# Patient Record
Sex: MALE | Race: WHITE | NOT HISPANIC OR LATINO | Employment: STUDENT | URBAN - METROPOLITAN AREA
[De-identification: names, ages, dates, MRNs, and addresses within clinical notes are randomized per-mention and may not be internally consistent; named-entity substitution may affect disease eponyms.]

---

## 2019-06-03 ENCOUNTER — TELEPHONE (OUTPATIENT)
Dept: SCHEDULING | Facility: IMAGING CENTER | Age: 19
End: 2019-06-03

## 2020-10-20 ENCOUNTER — HOSPITAL ENCOUNTER (OUTPATIENT)
Dept: CARDIOLOGY | Facility: MEDICAL CENTER | Age: 20
End: 2020-10-20
Attending: FAMILY MEDICINE
Payer: COMMERCIAL

## 2020-10-20 ENCOUNTER — HOSPITAL ENCOUNTER (OUTPATIENT)
Dept: LAB | Facility: MEDICAL CENTER | Age: 20
End: 2020-10-20
Attending: FAMILY MEDICINE
Payer: COMMERCIAL

## 2020-10-20 DIAGNOSIS — B33.22 COXSACKIE MYOCARDITIS: ICD-10-CM

## 2020-10-20 DIAGNOSIS — U07.1 INFECTION DUE TO 2019-NCOV: ICD-10-CM

## 2020-10-20 LAB
LV EJECT FRACT  99904: 60
LV EJECT FRACT MOD 2C 99903: 61.88
LV EJECT FRACT MOD 4C 99902: 61.37
LV EJECT FRACT MOD BP 99901: 60.34
TROPONIN T SERPL-MCNC: 9 NG/L (ref 6–19)

## 2020-10-20 PROCEDURE — 84484 ASSAY OF TROPONIN QUANT: CPT

## 2020-10-20 PROCEDURE — 93306 TTE W/DOPPLER COMPLETE: CPT | Mod: 26 | Performed by: INTERNAL MEDICINE

## 2020-10-20 PROCEDURE — 93306 TTE W/DOPPLER COMPLETE: CPT

## 2020-10-20 PROCEDURE — 36415 COLL VENOUS BLD VENIPUNCTURE: CPT

## 2021-02-18 ENCOUNTER — OFFICE VISIT (OUTPATIENT)
Dept: DERMATOLOGY | Facility: IMAGING CENTER | Age: 21
End: 2021-02-18
Payer: COMMERCIAL

## 2021-02-18 VITALS — TEMPERATURE: 98.6 F | BODY MASS INDEX: 25.9 KG/M2 | WEIGHT: 185 LBS | HEIGHT: 71 IN

## 2021-02-18 DIAGNOSIS — R21 RASH AND NONSPECIFIC SKIN ERUPTION: ICD-10-CM

## 2021-02-18 PROCEDURE — 11104 PUNCH BX SKIN SINGLE LESION: CPT | Performed by: DERMATOLOGY

## 2021-02-18 RX ORDER — ITRACONAZOLE 100 MG/1
100 CAPSULE ORAL
COMMUNITY
Start: 2021-01-06

## 2021-02-18 RX ORDER — LISDEXAMFETAMINE DIMESYLATE 20 MG/1
CAPSULE ORAL EVERY MORNING
COMMUNITY
Start: 2021-02-08

## 2021-02-18 NOTE — PROGRESS NOTES
DERMATOLOGY NOTE  NEW VISIT       Chief complaint: Rash     HPI:Fungal growth on back, trunk   Onset: 1 year  Symptoms: White patchy areas   Aggravating factors: Sunlight  Alleviating factors: No  New creams/topicals: No  New medications (up to last 6 months): No  New travel: No  Other exposures: No  Treatments: Itraconazole for 7 days (about 1 month ago from a derm in [place]), no improvement  Was told may need biopsy if no improvement.      History reviewed. No pertinent past medical history.     History reviewed. No pertinent family history.     Social History     Socioeconomic History   • Marital status: Single     Spouse name: Not on file   • Number of children: Not on file   • Years of education: Not on file   • Highest education level: Not on file   Occupational History   • Not on file   Tobacco Use   • Smoking status: Never Smoker   • Smokeless tobacco: Never Used   Substance and Sexual Activity   • Alcohol use: Not on file   • Drug use: Not on file   • Sexual activity: Not on file   Other Topics Concern   • Not on file   Social History Narrative   • Not on file     Social Determinants of Health     Financial Resource Strain:    • Difficulty of Paying Living Expenses:    Food Insecurity:    • Worried About Running Out of Food in the Last Year:    • Ran Out of Food in the Last Year:    Transportation Needs:    • Lack of Transportation (Medical):    • Lack of Transportation (Non-Medical):    Physical Activity:    • Days of Exercise per Week:    • Minutes of Exercise per Session:    Stress:    • Feeling of Stress :    Social Connections:    • Frequency of Communication with Friends and Family:    • Frequency of Social Gatherings with Friends and Family:    • Attends Congregational Services:    • Active Member of Clubs or Organizations:    • Attends Club or Organization Meetings:    • Marital Status:    Intimate Partner Violence:    • Fear of Current or Ex-Partner:    • Emotionally Abused:    • Physically Abused:   "  • Sexually Abused:         Allergies   Allergen Reactions   • Vancomycin         MEDICATIONS:  Medications relevant to specialty reviewed.    Current Outpatient Medications:   •  VYVANSE 20 MG Cap, Take  by mouth every morning. Take 1 capsule by mouth every morning, Disp: , Rfl:   •  itraconazole (SPORONAX) 100 MG Cap, Take 100 mg by mouth., Disp: , Rfl:      REVIEW OF SYSTEMS:   Positive for skin (see HPI)  Negative for fevers, chills and cough       EXAM:  Temp 37 °C (98.6 °F)   Ht 1.803 m (5' 11\")   Wt 83.9 kg (185 lb)   BMI 25.80 kg/m²   Constitutional: Well-developed, well-nourished, and in no distress.     A focused skin exam was performed including the affected areas of the neck, chest, abdomen and back. Notable findings on exam today listed below and/or in assessment/plan.   -exam in plan       IMPRESSION / PLAN:    1. Rash and nonspecific skin eruption  Exam: shoulders and back with hypopigmented patches   - Discussed option for biopsy to aid in diagnosis given poor response to standard treatment for tinea versicolor, patient in agreement  - Biopsy Procedure Note: biopsy by punch technique  - Location(s): right flank  - Photo taken with patient permission (see media tab)  - R/O tinea/pityriasis versicolor, postinflammatory hypopigmentation, r/o hypopigmented MF vs other?  - Plan if consistent with tinea versicolor, then fluconazole 300 mg q week x 4 weeks. S/e headache, GI upset, liver inflammation, skin rash discussed. And ketoconazole 2% shampoo daily until resolved, then 1x weekly for maintenance.     Punch biopsy x 1  After informed, written consent regarding the risks of scar, bleeding infection, numbness/nerve damage and discoloration was obtained and site confirmation with the patient, the site was cleaned with Hibiclens and infiltrated with 1% Lidocaine with epinephrine anesthesia.  A 4.0 mm punch biopsy was performed and hemostasis was achieved with 4.0 prolene suture.  The patient tolerated " the procedure without incident.  Topical vaseline and a wound dressing was applied.  Wound care instructions were verbally discussed and a handout provided.   The specimen was labeled and sent to pathology for evaluation.  Suture to be removed in 10-14 days. Okay to have [employer] nurse/doctor remove sutures.     Return to clinic in: Return pending path. and as needed for any new or changing skin lesions.    Naomie Sarabia M.D.

## 2021-02-24 ENCOUNTER — TELEPHONE (OUTPATIENT)
Dept: DERMATOLOGY | Facility: IMAGING CENTER | Age: 21
End: 2021-02-24

## 2021-02-24 RX ORDER — FLUCONAZOLE 150 MG/1
300 TABLET ORAL
Qty: 4 TABLET | Refills: 0 | Status: SHIPPED | OUTPATIENT
Start: 2021-02-24 | End: 2021-03-04

## 2021-02-24 RX ORDER — KETOCONAZOLE 20 MG/ML
SHAMPOO TOPICAL
Qty: 120 ML | Refills: 6 | Status: SHIPPED | OUTPATIENT
Start: 2021-02-24

## 2021-02-24 NOTE — TELEPHONE ENCOUNTER
Called patient. Informed that pathology was consistent with post-inflammatory hypopigmentation and no fungal elements were seen. Given extensive distribution, cannot rule out fungal elements elsewhere so patient opting to repeat treatment.    Start fluconazole 300 mg po once weekly for 2 weeks.  Start keto shampoo as body wash once weekly for prevention of recurrence.  Explained that once treated color often resolves in 1-2 months after treatment but often takes much longer (months to years).    See media tab for path report ([facility]path).